# Patient Record
Sex: MALE | Race: WHITE | NOT HISPANIC OR LATINO | Employment: OTHER | URBAN - METROPOLITAN AREA
[De-identification: names, ages, dates, MRNs, and addresses within clinical notes are randomized per-mention and may not be internally consistent; named-entity substitution may affect disease eponyms.]

---

## 2021-03-16 ENCOUNTER — CONSULT (OUTPATIENT)
Dept: SURGERY | Facility: CLINIC | Age: 61
End: 2021-03-16
Payer: COMMERCIAL

## 2021-03-16 VITALS
HEART RATE: 108 BPM | BODY MASS INDEX: 24.52 KG/M2 | DIASTOLIC BLOOD PRESSURE: 84 MMHG | HEIGHT: 72 IN | SYSTOLIC BLOOD PRESSURE: 138 MMHG | WEIGHT: 181 LBS | TEMPERATURE: 97.4 F

## 2021-03-16 DIAGNOSIS — K40.90 UNILATERAL INGUINAL HERNIA WITHOUT OBSTRUCTION OR GANGRENE, RECURRENCE NOT SPECIFIED: Primary | ICD-10-CM

## 2021-03-16 PROCEDURE — 99244 OFF/OP CNSLTJ NEW/EST MOD 40: CPT | Performed by: SPECIALIST

## 2021-03-16 RX ORDER — RAMIPRIL 2.5 MG/1
2.5 CAPSULE ORAL DAILY
COMMUNITY

## 2021-03-16 NOTE — H&P
Chief Complaint:  Right inguinal hernia      History of Present Illness:  Patient is a 62-year-old self-employed  who presents to the office today with fairly large right inguinal hernia  He said he has had it for a long time and it is getting bigger  He was seen by his family physician  Rona Borjas  PCP brandon Francis who happens to be a personal friend of his  He was referred to our office for evaluation of this  The patient says that is getting larger and starting to cause some more discomfort  He on occasion was a hernia belt which he says improves  He denies any nausea vomiting diarrhea constipation  He is a self-employed solo       Past Medical History: History reviewed  No pertinent past medical history  Past Surgical History:  History reviewed  No pertinent surgical history  Allergies:  No Known Allergies      Medications:    Current Outpatient Medications:     ramipril (ALTACE) 2 5 mg capsule, Take 2 5 mg by mouth daily, Disp: , Rfl:     sitaGLIPtin-metFORMIN (JANUMET)  MG per tablet, Take 1 tablet by mouth 2 (two) times a day with meals, Disp: , Rfl:       Social History:  Social History     Social History     Substance and Sexual Activity   Alcohol Use Yes    Alcohol/week: 1 0 standard drinks    Types: 1 Cans of beer per week    Frequency: Monthly or less    Drinks per session: 1 or 2    Binge frequency: Less than monthly     Social History     Substance and Sexual Activity   Drug Use Never     Social History     Tobacco Use   Smoking Status Never Smoker   Smokeless Tobacco Never Used         Family History:  History reviewed  No pertinent family history  Review of Systems:      As per the HPI  Intermittent right groin pain  He also complains of upper extremity and hand pain right greater than left  He is right handed    No weight loss weight gain fever chills night sweats chest pain nausea vomiting diarrhea constipation shortness of breath headaches blurry vision double vision sore throat chronic cough dysuria hematuria etcetera all other review of systems are negative    Vitals:  Vitals:    03/16/21 1402   BP: 138/84   Pulse: (!) 108   Temp: (!) 97 4 °F (36 3 °C)       Physical Exam:   patient is a middle-aged white male with multiple tattoos visible on his upper extremities who is awake alert no distress  Vital signs as above   Skin warm dry  Multiple tattoos are noted  Head normocephalic and atraumatic   Eyes MOI a EOM intact  Ears nose within normal limits   Throat gag reflex intact   Neck no masses thyromegaly lymphadenopathy palpable  Back no CVA or spinal tenderness   Lungs clear to a and P   Cor slightly tachycardic regular rhythm no murmurs carotid bruits  Abdomen flat firm nontender left groin demonstrates no hernia  Right groin shows a large bulge that is reducible with the patient the supine position consistent with a right inguinal hernia  He has no other abdominal masses or hernias noted  Extremities there are some arthritic changes noted  No thenar wasting is noted in the palms  Negative CC E  Neurologically A&O x3 cranial nerves 2-12 intact   Lymphatics no lymphadenopathy palpable      Lab Results: I have personally reviewed pertinent reports  See below  Imaging: I have personally reviewed pertinent reports  EKG, Pathology, and Other Studies: I have personally reviewed pertinent reports  No visits with results within 1 Day(s) from this visit  Latest known visit with results is:   No results found for any previous visit  Impression:    Right inguinal hernia in a self-employed individual who happens to be a   His job entails heavy lifting  We discussed time frame of his returning to work  [employer]  Plan:    Laboratory values were drawn by his family physician we will check these    The plan is to schedule him for a right inguinal hernia local anesthesia with IV sedation at the earliest possible date

## 2021-03-16 NOTE — LETTER
March 16, 2021     Ashutosh Jackson, DO  [address]  [facility] [address]    Patient: Royal Chan   YOB: 1960   Date of Visit: 3/16/2021       Dear Dora Cardenas,    Thank you for referring [place] Box to me for evaluation  Below are the relevant portions of my H&P  If you have questions, please do not hesitate to call me  I look forward to following [place] along with you  Sincerely,    Rambo Flowers MD        CC: No Recipients  Damari Osei MD  3/16/2021  3:50 PM  Signed  Chief Complaint:  Right inguinal hernia      History of Present Illness:  Patient is a 65-year-old self-employed  who presents to the office today with fairly large right inguinal hernia  He said he has had it for a long time and it is getting bigger  He was seen by his family physician  Rony Francis who happens to be a personal friend of his  He was referred to our office for evaluation of this  The patient says that is getting larger and starting to cause some more discomfort  He on occasion was a hernia belt which he says improves  He denies any nausea vomiting diarrhea constipation  He is a self-employed solo       Past Medical History: History reviewed  No pertinent past medical history  Past Surgical History:  History reviewed  No pertinent surgical history        Allergies:  No Known Allergies      Medications:    Current Outpatient Medications:     ramipril (ALTACE) 2 5 mg capsule, Take 2 5 mg by mouth daily, Disp: , Rfl:     sitaGLIPtin-metFORMIN (JANUMET)  MG per tablet, Take 1 tablet by mouth 2 (two) times a day with meals, Disp: , Rfl:       Social History:  Social History     Social History     Substance and Sexual Activity   Alcohol Use Yes    Alcohol/week: 1 0 standard drinks    Types: 1 Cans of beer per week    Frequency: Monthly or less    Drinks per session: 1 or 2    Binge frequency: Less than monthly     Social History Substance and Sexual Activity   Drug Use Never     Social History     Tobacco Use   Smoking Status Never Smoker   Smokeless Tobacco Never Used         Family History:  History reviewed  No pertinent family history  Review of Systems:      As per the HPI  Intermittent right groin pain  He also complains of upper extremity and hand pain right greater than left  He is right handed  No weight loss weight gain fever chills night sweats chest pain nausea vomiting diarrhea constipation shortness of breath headaches blurry vision double vision sore throat chronic cough dysuria hematuria etcetera all other review of systems are negative    Vitals:  Vitals:    03/16/21 1402   BP: 138/84   Pulse: (!) 108   Temp: (!) 97 4 °F (36 3 °C)       Physical Exam:   patient is a middle-aged white male with multiple tattoos visible on his upper extremities who is awake alert no distress  Vital signs as above   Skin warm dry  Multiple tattoos are noted  Head normocephalic and atraumatic   Eyes MOI a EOM intact  Ears nose within normal limits   Throat gag reflex intact   Neck no masses thyromegaly lymphadenopathy palpable  Back no CVA or spinal tenderness   Lungs clear to a and P   Cor slightly tachycardic regular rhythm no murmurs carotid bruits  Abdomen flat firm nontender left groin demonstrates no hernia  Right groin shows a large bulge that is reducible with the patient the supine position consistent with a right inguinal hernia  He has no other abdominal masses or hernias noted  Extremities there are some arthritic changes noted  No thenar wasting is noted in the palms  Negative CC E  Neurologically A&O x3 cranial nerves 2-12 intact   Lymphatics no lymphadenopathy palpable      Lab Results: I have personally reviewed pertinent reports  See below  Imaging: I have personally reviewed pertinent reports  EKG, Pathology, and Other Studies: I have personally reviewed pertinent reports       No visits with results within 1 Day(s) from this visit  Latest known visit with results is:   No results found for any previous visit  Impression:    Right inguinal hernia in a self-employed individual who happens to be a   His job entails heavy lifting  We discussed time frame of his returning to work  [employer]  Plan:    Laboratory values were drawn by his family physician we will check these  The plan is to schedule him for a right inguinal hernia local anesthesia with IV sedation at the earliest possible date

## 2021-03-23 ENCOUNTER — TELEPHONE (OUTPATIENT)
Dept: SURGERY | Facility: CLINIC | Age: 61
End: 2021-03-23

## 2022-05-10 ENCOUNTER — OFFICE VISIT (OUTPATIENT)
Dept: SURGERY | Facility: CLINIC | Age: 62
End: 2022-05-10
Payer: COMMERCIAL

## 2022-05-10 VITALS
DIASTOLIC BLOOD PRESSURE: 90 MMHG | HEIGHT: 72 IN | TEMPERATURE: 97 F | SYSTOLIC BLOOD PRESSURE: 142 MMHG | BODY MASS INDEX: 24.38 KG/M2 | WEIGHT: 180 LBS | HEART RATE: 92 BPM | OXYGEN SATURATION: 98 %

## 2022-05-10 DIAGNOSIS — K40.90 RIGHT INGUINAL HERNIA: Primary | ICD-10-CM

## 2022-05-10 PROCEDURE — 99214 OFFICE O/P EST MOD 30 MIN: CPT | Performed by: SPECIALIST
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
